# Patient Record
Sex: MALE | Race: WHITE
[De-identification: names, ages, dates, MRNs, and addresses within clinical notes are randomized per-mention and may not be internally consistent; named-entity substitution may affect disease eponyms.]

---

## 2018-02-09 ENCOUNTER — HOSPITAL ENCOUNTER (EMERGENCY)
Dept: HOSPITAL 17 - PHEFT | Age: 23
Discharge: HOME | End: 2018-02-09
Payer: SELF-PAY

## 2018-02-09 VITALS
OXYGEN SATURATION: 98 % | DIASTOLIC BLOOD PRESSURE: 74 MMHG | TEMPERATURE: 98.7 F | HEART RATE: 82 BPM | SYSTOLIC BLOOD PRESSURE: 124 MMHG | RESPIRATION RATE: 16 BRPM

## 2018-02-09 VITALS — WEIGHT: 135.58 LBS | HEIGHT: 69 IN | BODY MASS INDEX: 20.08 KG/M2

## 2018-02-09 DIAGNOSIS — S67.192A: Primary | ICD-10-CM
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
## 2018-02-09 PROCEDURE — 99283 EMERGENCY DEPT VISIT LOW MDM: CPT

## 2018-02-09 PROCEDURE — 73140 X-RAY EXAM OF FINGER(S): CPT

## 2018-02-09 NOTE — PD
HPI


Chief Complaint:  Injury


Time Seen by Provider:  17:46


Travel History


International Travel<30 days:  No


Contact w/Intl Traveler<30days:  No


Traveled to known affect area:  No





History of Present Illness


HPI


This is a 22-year-old male here with a crush injury to the right third digit 

prior to arrival.  Patient reports that he closed his finger in a door jam 

which caused a small laceration to the finger pad.  He has had pain and 

swelling since.  He reports normal sensation and full range of motion of the 

finger.  Symptom severity is moderate.  No aggravating or alleviating factors.





[facility]


Past Medical History


Medical History:  Denies Significant Hx





Social History


Alcohol Use:  No


Tobacco Use:  No


Substance Use:  No





Allergies-Medications


(Allergen,Severity, Reaction):  


Coded Allergies:  


     No Known Allergies (Unverified  Adverse Reaction, Unknown, 2/9/18)


Reported Meds & Prescriptions





Reported Meds & Active Scripts


Active


No Active Prescriptions or Reported Medications    








Review of Systems


Except as stated in HPI:  all other systems reviewed are Neg





Physical Exam


Narrative


GENERAL: Alert and well-appearing 22-year-old male


SKIN: Warm and dry. 0.5CM superficial laceration to the finger pad.  No active 

bleeding.  


HEAD: Normocephalic.


EYES: No injection or drainage. 


NECK: Supple.


MUSCULOSKELETAL: No cyanosis, or edema.  Right upper extremity: Tenderness of 

the right third distal phalanx.  Small 0.5 cm superficial laceration to the 

finger pad.  No active bleeding.  Patient can flex and extend all joints within 

the digit.  Normal sensation.  Brisk cap refill.











Data


Data


Last Documented VS





Vital Signs








  Date Time  Temp Pulse Resp B/P (MAP) Pulse Ox O2 Delivery O2 Flow Rate FiO2


 


2/9/18 17:41 98.7 82 16 124/74 (91) 98   








Orders





 Orders


Finger ([id number]) (2/9/18 )








[facility]


Medical Decision Making


Medical Screen Exam Complete:  Yes


Emergency Medical Condition:  Yes


Differential Diagnosis


Crush injury, finger laceration, finger fracture


Narrative Course


22-year-old male here with a crush injury to the right third digit.  The 

extremity and digit is neurovascular intact.  Superficial abrasion which does 

not need suturing.





X-ray right finger: Negative for fracture





Diagnosis





 Primary Impression:  


 Crushing injury of finger of right hand


Referrals:  


Primary Care Physician





***Additional Instructions:  


Ice and elevate the extremity.


Cleansed the wound was soaked in water daily.


Apply a thin layer of antibiotic ointment and cover with dressing.


Follow-up with her primary doctor


Scripts


No Active Prescriptions or Reported Meds


Disposition:  01 DISCHARGE HOME


Condition:  Stable











Liane Patel Feb 9, 2018 17:52

## 2018-02-09 NOTE — RADRPT
EXAM DATE/TIME:  02/09/2018 18:03 

 

[facility] COMPARISON:     

No previous studies available for comparison.

 

                     

INDICATIONS :     

Right hand, third digit pain post slamming in door. 

                     

 

MEDICAL HISTORY :     

None.          

 

SURGICAL HISTORY :     

None.   

 

ENCOUNTER:     

Initial                                        

 

ACUITY:     

1 day      

 

PAIN SCORE:     

6/10

 

LOCATION:     

Right upper extremity 

 

FINDINGS:     

Examination of the third digit of the right hand demonstrates no evidence of fracture or dislocation.
  No radiopaque foreign bodies are seen.  The soft tissues are intact.

 

CONCLUSION:     

No acute fracture or joint dislocation.

 

 

 

 Moe Strange MD on February 09, 2018 at 18:25           

Board Certified Radiologist.

 This report was verified electronically.